# Patient Record
Sex: FEMALE | Race: WHITE | URBAN - METROPOLITAN AREA
[De-identification: names, ages, dates, MRNs, and addresses within clinical notes are randomized per-mention and may not be internally consistent; named-entity substitution may affect disease eponyms.]

---

## 2018-05-28 ENCOUNTER — HOSPITAL ENCOUNTER (EMERGENCY)
Facility: CLINIC | Age: 58
Discharge: HOME | End: 2018-05-28
Attending: EMERGENCY MEDICINE | Admitting: EMERGENCY MEDICINE
Payer: OTHER GOVERNMENT

## 2018-05-28 ENCOUNTER — APPOINTMENT (OUTPATIENT)
Dept: GENERAL RADIOLOGY | Facility: CLINIC | Age: 58
End: 2018-05-28
Attending: EMERGENCY MEDICINE
Payer: OTHER GOVERNMENT

## 2018-05-28 VITALS
DIASTOLIC BLOOD PRESSURE: 82 MMHG | RESPIRATION RATE: 16 BRPM | TEMPERATURE: 98.3 F | WEIGHT: 175.1 LBS | OXYGEN SATURATION: 98 % | SYSTOLIC BLOOD PRESSURE: 165 MMHG | HEART RATE: 82 BPM

## 2018-05-28 DIAGNOSIS — S61.213A LACERATION OF LEFT MIDDLE FINGER WITHOUT FOREIGN BODY WITHOUT DAMAGE TO NAIL, INITIAL ENCOUNTER: ICD-10-CM

## 2018-05-28 PROCEDURE — 73130 X-RAY EXAM OF HAND: CPT | Mod: LT

## 2018-05-28 PROCEDURE — 12001 RPR S/N/AX/GEN/TRNK 2.5CM/<: CPT | Performed by: EMERGENCY MEDICINE

## 2018-05-28 PROCEDURE — 90715 TDAP VACCINE 7 YRS/> IM: CPT | Performed by: EMERGENCY MEDICINE

## 2018-05-28 PROCEDURE — 99282 EMERGENCY DEPT VISIT SF MDM: CPT | Mod: 25 | Performed by: EMERGENCY MEDICINE

## 2018-05-28 PROCEDURE — 90471 IMMUNIZATION ADMIN: CPT | Performed by: EMERGENCY MEDICINE

## 2018-05-28 PROCEDURE — 12001 RPR S/N/AX/GEN/TRNK 2.5CM/<: CPT | Mod: Z6 | Performed by: EMERGENCY MEDICINE

## 2018-05-28 PROCEDURE — 25000128 H RX IP 250 OP 636: Performed by: EMERGENCY MEDICINE

## 2018-05-28 PROCEDURE — 99283 EMERGENCY DEPT VISIT LOW MDM: CPT | Mod: 25 | Performed by: EMERGENCY MEDICINE

## 2018-05-28 RX ORDER — BUPIVACAINE HYDROCHLORIDE 5 MG/ML
INJECTION, SOLUTION EPIDURAL; INTRACAUDAL
Status: DISCONTINUED
Start: 2018-05-28 | End: 2018-05-28 | Stop reason: HOSPADM

## 2018-05-28 RX ORDER — LORATADINE 10 MG/1
10 TABLET ORAL DAILY
COMMUNITY

## 2018-05-28 RX ADMIN — CLOSTRIDIUM TETANI TOXOID ANTIGEN (FORMALDEHYDE INACTIVATED), CORYNEBACTERIUM DIPHTHERIAE TOXOID ANTIGEN (FORMALDEHYDE INACTIVATED), BORDETELLA PERTUSSIS TOXOID ANTIGEN (GLUTARALDEHYDE INACTIVATED), BORDETELLA PERTUSSIS FILAMENTOUS HEMAGGLUTININ ANTIGEN (FORMALDEHYDE INACTIVATED), BORDETELLA PERTUSSIS PERTACTIN ANTIGEN, AND BORDETELLA PERTUSSIS FIMBRIAE 2/3 ANTIGEN 0.5 ML: 5; 2; 2.5; 5; 3; 5 INJECTION, SUSPENSION INTRAMUSCULAR at 19:23

## 2018-05-28 ASSESSMENT — ENCOUNTER SYMPTOMS
WOUND: 1
ABDOMINAL PAIN: 0
SHORTNESS OF BREATH: 0
NUMBNESS: 0
FEVER: 0
WEAKNESS: 0

## 2018-05-28 NOTE — ED AVS SNAPSHOT
[facility], [place], Emergency Department    [address] [place] [address]    Phone:  [phone]                                       Erin Tejeda   MRN: [id number]    Department:  [facility], Emergency Department   Date of Visit:  5/28/2018           After Visit Summary Signature Page     I have received my discharge instructions, and my questions have been answered. I have discussed any challenges I see with this plan with the nurse or doctor.    ..........................................................................................................................................  Patient/Patient Representative Signature      ..........................................................................................................................................  Patient Representative Print Name and Relationship to Patient    ..................................................               ................................................  Date                                            Time    ..........................................................................................................................................  Reviewed by Signature/Title    ...................................................              ..............................................  Date                                                            Time

## 2018-05-28 NOTE — ED PROVIDER NOTES
History     Chief Complaint   Patient presents with     Laceration     HPI  Erin Tejeda is a right-handed 57 year old female who presents to the ED for evaluation after sustaining a laceration. The patient reports that her  was sharpening a  's knife when she was hit with this knife in the left middle finger when her hand got in the way of the knife.  She presents with bleeding from her middle finger.  A family member put a dressing over the finger and the bleeding was controlled.  Last Tdap in 2007. No CMS changes distally, and the bleeding is largely controlled with a bandage placed at home.           PAST MEDICAL HISTORY: No past medical history on file.    PAST SURGICAL HISTORY:   Past Surgical History:   Procedure Laterality Date     ORTHOPEDIC SURGERY      bilateral hip replacement       FAMILY HISTORY: No family history on file.    SOCIAL HISTORY:   Social History   Substance Use Topics     Smoking status: Never Smoker     Smokeless tobacco: Never Used     Alcohol use Not on file       Patient's Medications   New Prescriptions    No medications on file   Previous Medications    DIPHENHYDRAMINE HCL (BENADRYL PO)        LORATADINE (CLARITIN) 10 MG TABLET    Take 10 mg by mouth daily   Modified Medications    No medications on file   Discontinued Medications    No medications on file        No Known Allergies      I have reviewed the Medications, Allergies, Past Medical and Surgical History, and Social History in the Epic system.    Review of Systems   Constitutional: Negative for fever.   Respiratory: Negative for shortness of breath.    Cardiovascular: Negative for chest pain.   Gastrointestinal: Negative for abdominal pain.   Skin: Positive for wound (See HPI).   Neurological: Negative for weakness and numbness.   All other systems reviewed and are negative.      Physical Exam   BP: 165/82  Heart Rate: 82  Temp: 98.3  F (36.8  C)  Resp: 16  Weight: 79.4 kg (175 lb 1.6 oz)  SpO2: 98  %      Physical Exam   Constitutional: She is oriented to person, place, and time. She appears well-developed and well-nourished.   Pulmonary/Chest: Effort normal and breath sounds normal.   Musculoskeletal:        Left hand: She exhibits laceration. She exhibits normal two-point discrimination, normal capillary refill and no swelling. Normal sensation noted. She exhibits no finger abduction.        Hands:  Neurological: She is alert and oriented to person, place, and time.   Skin: Skin is warm.   Psychiatric: She has a normal mood and affect.   Vitals reviewed.      ED Course     ED Course     Procedures       [facility] Procedure Note        Laceration Repair:    Performed by: Pennie Iverson  Authorized by: Pennie Iverson  Consent given by: Patient who states understanding of the procedure being performed after discussing the risks, benefits and alternatives.    Preparation: Patient was prepped and draped in usual sterile fashion.  Irrigation solution: saline    Body area:left middle  finger  Laceration length: 1.8 cm  Contamination: The wound is not contaminated.  Foreign bodies:none  Tendon involvement: none  Anesthesia: Local  Local anesthetic: Bupivacaine 0.5%  Anesthetic total: 2ml    Debridement: none  Skin closure: Closed with 3 x 5.0 Ethilon  Technique: interrupted  Approximation: close  Approximation difficulty: simple    Patient tolerance: Patient tolerated the procedure well with no immediate complications.      Critical Care time:  none           Results for orders placed or performed during the hospital encounter of 05/28/18 (from the past 24 hour(s))   XR Hand Left G/E 3 Views    Narrative    Radiograph of the left hand, 3 views  5/28/2018 8:02 PM      HISTORY: Middle finger laceration    COMPARISON: None    FINDINGS: Bandage over the middle finger. There is a ring obstructing  the view of the middle of the fourth proximal phalanx. No evidence of  fracture or dislocation. The middle finger  laceration is not well  visualized on this study.      Impression    IMPRESSION: No acute abnormality. The known middle finger laceration  is not well-visualized.      Labs Ordered and Resulted from Time of ED Arrival Up to the Time of Departure from the ED - No data to display         Assessments & Plan (with Medical Decision Making)       I have reviewed the nursing notes.  Emergency Department course:  The patient was seen and examined at 1844 pm.  The patient has a 1.8 cm laceration over the dorsal aspect of her left middle finger PIP joint.  She has full active range of motion of that finger including extension against pressure.    Procedure note: Laceration repair: Please see details above.  The laceration was sutured with 5-0 Ethilon, 3 sutures, and a tube gauze dressing was placed.    Patient was treated with a Tdap vaccination.  Left hand Xray shows no acute abnormality. The laceration is not well visualized on x-ray    The patient was discharged with wound care precautions.  Sutures out in 10-14 days.  She was given the number to the orthopedic clinic for follow-up  I have reviewed the findings, diagnosis, plan and need for follow up with the patient.    New Prescriptions    No medications on file       Final diagnoses:   Laceration of left middle finger without foreign body without damage to nail, initial encounter       5/28/2018   [facility], [place], EMERGENCY DEPARTMENT  This note was created in part by the use of Dragon voice recognition dictation system. Inadvertent grammatical errors and typographical errors may still exist.  MD Kishor Odonnell Alda L, MD  05/28/18 [id number]

## 2018-05-28 NOTE — ED AVS SNAPSHOT
[facility], Emergency Department    [address] [facility] [address]    Phone:  [phone]                                       Erin Tejeda   MRN: [id number]    Department:  [facility], Emergency Department   Date of Visit:  5/28/2018           Patient Information     Date Of Birth          1960        Your diagnoses for this visit were:     Laceration of left middle finger without foreign body without damage to nail, initial encounter        You were seen by Pennie Iverson MD.        Discharge Instructions       Please make an appointment to follow up with Orthopedics (phone: [phone]) in 7 days.  Sutures out in 10-14 days.  Return for signs of infection, including redness, worsening pain, fevers, or other concerns.    Discharge References/Attachments     LACERATION, EXTREMITY: STITCHES, STAPLE, OR TAPE (ENGLISH)      24 Hour Appointment Hotline       To make an appointment at any [facility] clinic, call [phone] ([phone]). If you don't have a family doctor or clinic, we will help you find one. [facility] clinics are conveniently located to serve the needs of you and your family.             Review of your medicines      Our records show that you are taking the medicines listed below. If these are incorrect, please call your family doctor or clinic.        Dose / Directions Last dose taken    BENADRYL PO        Refills:  0        loratadine 10 MG tablet   Commonly known as:  CLARITIN   Dose:  10 mg        Take 10 mg by mouth daily   Refills:  0                Procedures and tests performed during your visit     XR Hand Left G/E 3 Views      Orders Needing Specimen Collection     None      Pending Results     Date and Time Order Name Status Description    5/28/2018 1923 XR Hand Left G/E 3 Views Preliminary             Pending Culture Results     No orders found from 5/26/2018 to 5/29/2018.            Pending Results Instructions     If you had any lab results that were not  "finalized at the time of your Discharge, you can call the ED Lab Result RN at [phone]. You will be contacted by this team for any positive Lab results or changes in treatment. The nurses are available 7 days a week from 10A to 6:30P.  You can leave a message 24 hours per day and they will return your call.        Thank you for choosing [facility]       Thank you for choosing [facility] for your care. Our goal is always to provide you with excellent care. Hearing back from our patients is one way we can continue to improve our services. Please take a few minutes to complete the written survey that you may receive in the mail after you visit with us. Thank you!        [facility] Information     [employer] lets you send messages to your doctor, view your test results, renew your prescriptions, schedule appointments and more. To sign up, go to [email or web address] . Click on \"Log in\" on the left side of the screen, which will take you to the Welcome page. Then click on \"Sign up Now\" on the right side of the page.     You will be asked to enter the access code listed below, as well as some personal information. Please follow the directions to create your username and password.     Your access code is: [email or web address]  Expires: 2018  8:15 PM     Your access code will  in 90 days. If you need help or a new code, please call your [facility] clinic or [phone].        Care EveryWhere ID     This is your Care EveryWhere ID. This could be used by other organizations to access your [facility] medical records  [id number]        Equal Access to Services     BAYLEE VILLA : Hadii helena Moore, wanickda keara, qaybta kaalmamichelet ruiz . So [facility] [phone].    ATENCIÓN: Si habla español, tiene a daniel disposición servicios gratuitos de asistencia lingüística. Llame al [phone].    We comply with applicable federal civil rights laws and [place] laws. We do not " discriminate on the basis of race, color, national origin, age, disability, sex, sexual orientation, or gender identity.            After Visit Summary       This is your record. Keep this with you and show to your community pharmacist(s) and doctor(s) at your next visit.

## 2018-05-28 NOTE — ED TRIAGE NOTES
Erin presents from home with c/o a laceration to her left middle finger from a kitchen knife. Bleeding currently controlled with a pressure dressing. CMS intact. Last tetanus vaccine was in 2007.  No relevant medical history/medications.

## 2018-05-29 NOTE — DISCHARGE INSTRUCTIONS
Please make an appointment to follow up with Orthopedics (phone: [phone]) in 7 days.  Sutures out in 10-14 days.  Return for signs of infection, including redness, worsening pain, fevers, or other concerns.

## 2018-06-12 ENCOUNTER — HEALTH MAINTENANCE LETTER (OUTPATIENT)
Age: 58
End: 2018-06-12

## 2020-03-11 ENCOUNTER — HEALTH MAINTENANCE LETTER (OUTPATIENT)
Age: 60
End: 2020-03-11

## 2021-01-03 ENCOUNTER — HEALTH MAINTENANCE LETTER (OUTPATIENT)
Age: 61
End: 2021-01-03

## 2021-04-25 ENCOUNTER — HEALTH MAINTENANCE LETTER (OUTPATIENT)
Age: 61
End: 2021-04-25

## 2021-10-10 ENCOUNTER — HEALTH MAINTENANCE LETTER (OUTPATIENT)
Age: 61
End: 2021-10-10

## 2022-03-26 ENCOUNTER — HEALTH MAINTENANCE LETTER (OUTPATIENT)
Age: 62
End: 2022-03-26

## 2022-05-21 ENCOUNTER — HEALTH MAINTENANCE LETTER (OUTPATIENT)
Age: 62
End: 2022-05-21

## 2022-09-18 ENCOUNTER — HEALTH MAINTENANCE LETTER (OUTPATIENT)
Age: 62
End: 2022-09-18

## 2023-06-04 ENCOUNTER — HEALTH MAINTENANCE LETTER (OUTPATIENT)
Age: 63
End: 2023-06-04

## 2024-02-25 ENCOUNTER — HEALTH MAINTENANCE LETTER (OUTPATIENT)
Age: 64
End: 2024-02-25